# Patient Record
Sex: MALE | Race: WHITE
[De-identification: names, ages, dates, MRNs, and addresses within clinical notes are randomized per-mention and may not be internally consistent; named-entity substitution may affect disease eponyms.]

---

## 2019-04-04 ENCOUNTER — HOSPITAL ENCOUNTER (EMERGENCY)
Dept: HOSPITAL 47 - EC | Age: 59
Discharge: HOME | End: 2019-04-04
Payer: COMMERCIAL

## 2019-04-04 VITALS — TEMPERATURE: 98.4 F | SYSTOLIC BLOOD PRESSURE: 136 MMHG | DIASTOLIC BLOOD PRESSURE: 78 MMHG | HEART RATE: 74 BPM

## 2019-04-04 VITALS — RESPIRATION RATE: 18 BRPM

## 2019-04-04 DIAGNOSIS — Z23: ICD-10-CM

## 2019-04-04 DIAGNOSIS — S01.01XA: Primary | ICD-10-CM
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
## 2019-04-04 PROCEDURE — 99284 EMERGENCY DEPT VISIT MOD MDM: CPT

## 2019-04-04 PROCEDURE — 70450 CT HEAD/BRAIN W/O DYE: CPT

## 2019-04-04 PROCEDURE — 12001 RPR S/N/AX/GEN/TRNK 2.5CM/<: CPT

## 2019-04-04 PROCEDURE — 72125 CT NECK SPINE W/O DYE: CPT

## 2019-04-04 PROCEDURE — 90471 IMMUNIZATION ADMIN: CPT

## 2019-04-04 PROCEDURE — 90715 TDAP VACCINE 7 YRS/> IM: CPT

## 2019-04-04 NOTE — CT
EXAM:

  CT Head Without Intravenous Contrast

 

CLINICAL HISTORY:

   fall, head lac

 

TECHNIQUE:

  Axial computed tomography images of the head/brain without intravenous 

contrast.  CTDI is 45.29 mGy and DLP is 995.7 mGy-cm.  This CT exam was 

performed using one or more of the following dose reduction techniques: 

automated exposure control, adjustment of the mA and/or kV according to 

patient size, and/or use of iterative reconstruction technique.

 

COMPARISON:

  No relevant prior studies available.

 

FINDINGS:

  Brain:  Unremarkable.  No hemorrhage.  No significant white matter 

disease.  No edema.

  Ventricles:  Unremarkable.  No ventriculomegaly.

  Bones/joints:  Unremarkable.  No acute fracture.

  Soft tissues:  Soft tissue swelling/hematoma overlies the posterior 

right parietal calvarium.

  Sinuses:  Unremarkable as visualized.  No acute sinusitis.

  Mastoid air cells:  Unremarkable as visualized.  No mastoid effusion.

  Orbits:  Proptosis is suggested.

 

IMPRESSION:     

1.  No acute intracranial pathology.

2.  Soft tissue swelling/hematoma overlies the posterior right parietal 

calvarium.  No acute fracture.

3.  Proptosis is suggested.  Correlate clinically.

 

_______________________________________________

 

EXAM:

  CT Cervical Spine Without Intravenous Contrast

 

CLINICAL HISTORY:

  fall, head lac

 

TECHNIQUE:

  Axial computed tomography images of the cervical spine without 

intravenous contrast.  CTDI is 17.49 mGy and DLP is 473 mGy-cm.  This CT 

exam was performed using one or more of the following dose reduction 

techniques: automated exposure control, adjustment of the mA and/or kV 

according to patient size, and/or use of iterative reconstruction 

technique.

 

COMPARISON:

  No relevant prior studies available.

 

FINDINGS:

  Vertebrae:  No evidence of acute fracture or traumatic malalignment.  

Mild/moderate spondylosis with varying degrees of central canal and 

foramina stenoses, most notable at C6-C7.

  Discs/spinal canal/neural foramina:  See above.

  Soft tissues:  Unremarkable on this noncontrast study.

  Lung apices:  Mild biapical emphysematous changes are noted.

 

IMPRESSION:     

1.  No evidence of acute fracture or traumatic malalignment.

2.  Mild/moderate spondylosis with varying degrees of central canal and 

foramina stenoses, most notable at C6-C7.  MRI of the cervical spine at 

be obtained for further evaluation, if clinically indicated.

## 2019-04-05 NOTE — CDI
Documentation Clarification OP



Dear Callie PUENTES, DO



Please do addendum to ED report that describes the length and depth of the 
repair.



Thank you,

Rakel Bronson





If you have any question, Please contact  at [phone]



[facility]

## 2022-05-29 ENCOUNTER — HOSPITAL ENCOUNTER (EMERGENCY)
Dept: HOSPITAL 47 - EC | Age: 62
Discharge: OTHER INSTITUTION | End: 2022-05-29
Payer: COMMERCIAL

## 2022-05-29 VITALS — SYSTOLIC BLOOD PRESSURE: 107 MMHG | RESPIRATION RATE: 16 BRPM | HEART RATE: 72 BPM | DIASTOLIC BLOOD PRESSURE: 84 MMHG

## 2022-05-29 DIAGNOSIS — S66.201A: ICD-10-CM

## 2022-05-29 DIAGNOSIS — S62.501A: Primary | ICD-10-CM
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
## 2022-05-29 DIAGNOSIS — I10: ICD-10-CM

## 2022-05-29 LAB
ALBUMIN SERPL-MCNC: 5 G/DL (ref 3.5–5)
ALP SERPL-CCNC: 73 U/L (ref 38–126)
ALT SERPL-CCNC: 34 U/L (ref 4–49)
ANION GAP SERPL CALC-SCNC: 15 MMOL/L
APTT BLD: 22.1 SEC (ref 22–30)
AST SERPL-CCNC: 33 U/L (ref 17–59)
BASOPHILS # BLD AUTO: 0.1 K/UL (ref 0–0.2)
BASOPHILS NFR BLD AUTO: 1 %
BUN SERPL-SCNC: 15 MG/DL (ref 9–20)
CALCIUM SPEC-MCNC: 11.1 MG/DL (ref 8.4–10.2)
CHLORIDE SERPL-SCNC: 101 MMOL/L (ref 98–107)
CO2 SERPL-SCNC: 22 MMOL/L (ref 22–30)
EOSINOPHIL # BLD AUTO: 0.2 K/UL (ref 0–0.7)
EOSINOPHIL NFR BLD AUTO: 2 %
ERYTHROCYTE [DISTWIDTH] IN BLOOD BY AUTOMATED COUNT: 4.76 M/UL (ref 4.3–5.9)
ERYTHROCYTE [DISTWIDTH] IN BLOOD: 13.9 % (ref 11.5–15.5)
GLUCOSE SERPL-MCNC: 170 MG/DL (ref 74–99)
HCT VFR BLD AUTO: 42.4 % (ref 39–53)
HGB BLD-MCNC: 13.9 GM/DL (ref 13–17.5)
INR PPP: 1.2 (ref ?–1.2)
LYMPHOCYTES # SPEC AUTO: 2.9 K/UL (ref 1–4.8)
LYMPHOCYTES NFR SPEC AUTO: 29 %
MCH RBC QN AUTO: 29.3 PG (ref 25–35)
MCHC RBC AUTO-ENTMCNC: 32.9 G/DL (ref 31–37)
MCV RBC AUTO: 89.1 FL (ref 80–100)
MONOCYTES # BLD AUTO: 0.4 K/UL (ref 0–1)
MONOCYTES NFR BLD AUTO: 4 %
NEUTROPHILS # BLD AUTO: 6.3 K/UL (ref 1.3–7.7)
NEUTROPHILS NFR BLD AUTO: 63 %
PLATELET # BLD AUTO: 217 K/UL (ref 150–450)
POTASSIUM SERPL-SCNC: 4 MMOL/L (ref 3.5–5.1)
PROT SERPL-MCNC: 7.6 G/DL (ref 6.3–8.2)
PT BLD: 12.7 SEC (ref 9–12)
SODIUM SERPL-SCNC: 138 MMOL/L (ref 137–145)
WBC # BLD AUTO: 10 K/UL (ref 3.8–10.6)

## 2022-05-29 PROCEDURE — 86901 BLOOD TYPING SEROLOGIC RH(D): CPT

## 2022-05-29 PROCEDURE — 85025 COMPLETE CBC W/AUTO DIFF WBC: CPT

## 2022-05-29 PROCEDURE — 80053 COMPREHEN METABOLIC PANEL: CPT

## 2022-05-29 PROCEDURE — 36415 COLL VENOUS BLD VENIPUNCTURE: CPT

## 2022-05-29 PROCEDURE — 96375 TX/PRO/DX INJ NEW DRUG ADDON: CPT

## 2022-05-29 PROCEDURE — 86850 RBC ANTIBODY SCREEN: CPT

## 2022-05-29 PROCEDURE — 86900 BLOOD TYPING SEROLOGIC ABO: CPT

## 2022-05-29 PROCEDURE — 85730 THROMBOPLASTIN TIME PARTIAL: CPT

## 2022-05-29 PROCEDURE — 96376 TX/PRO/DX INJ SAME DRUG ADON: CPT

## 2022-05-29 PROCEDURE — 96365 THER/PROPH/DIAG IV INF INIT: CPT

## 2022-05-29 PROCEDURE — 73130 X-RAY EXAM OF HAND: CPT

## 2022-05-29 PROCEDURE — 99285 EMERGENCY DEPT VISIT HI MDM: CPT

## 2022-05-29 PROCEDURE — 85610 PROTHROMBIN TIME: CPT

## 2022-05-29 NOTE — XR
EXAMINATION TYPE: XR hand complete RT

 

DATE OF EXAM: 5/29/2022

 

COMPARISON: NONE

 

HISTORY: Laceration

 

TECHNIQUE: 3 views

 

FINDINGS: There is comminuted fracture of the Mid shaft of the first metacarpal. There is soft tissue
 deformity and laceration. No foreign body seen.

The other metacarpals appear intact. Fingers are intact.

 

IMPRESSION: Comminuted and displaced fracture of the first metacarpal. Laceration deformity.

## 2023-09-28 NOTE — ED
Fall HPI





- General


Chief Complaint: Fall


Stated Complaint: Fall, [facility]


Time Seen by Provider: 04/04/19 03:49


Source: patient, EMS


Mode of arrival: EMS





- History of Present Illness


Initial Comments: 


Alexandre is a 59-year-old woman presents the ED via EMS for evaluation of 

laceration to the head.  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

it was not in motion, he lost his balance falling backwards, he struck his head 

on the asphalt.  He did not lose consciousness he was ambulatory on scene 

however considering that this was a work related accident he didn't sustain a 

head injury he was encouraged come to the ER for evaluation.  Patient denies any

additional injuries.  He does not recall when his last tetanus vaccination was.








- Related Data


                                    Allergies











Allergy/AdvReac Type Severity Reaction Status Date / Time


 


No Known Allergies Allergy   Verified 04/04/19 03:40














Review of Systems


ROS Statement: 


Those systems with pertinent positive or pertinent negative responses have been 

documented in the HPI.





ROS Other: All systems not noted in ROS Statement are negative.





Past Medical History


Past Medical History: Hypertension


History of Any Multi-Drug Resistant Organisms: None Reported


Past Surgical History: Back Surgery, Cholecystectomy, Orthopedic Surgery


Past Psychological History: No Psychological Hx Reported


Smoking Status: Never smoker


Past Alcohol Use History: Rare


Past Drug Use History: None Reported





General Exam





- General Exam Comments


Initial Comments: 


Physical Exam


GENERAL:


Patient is well-developed and well-nourished.  


Patient is nontoxic and well-hydrated and is in no distress.





HENT:


Normocephalic


Hematoma over the occiput with a small laceration bleeding is controlled





EYES:


PERRL, EOMI





PULMONARY:


Unlabored respirations.  No audible rales rhonchi or wheezing was noted.





CARDIOVASCULAR:


There is a regular rate and rhythm without any murmurs gallops or rubs.  





ABDOMEN:


Soft and nontender with normal bowel sounds. 





SKIN:


Skin is clear with no lesions or rashes and otherwise unremarkable.





: 


Deferred





NEUROLOGIC:


Patient is alert and oriented x3.  Moving all extremities spontaneously





MUSCULOSKELETAL:


Normal extremities with adequate strength and full range of motion.  No lower 

extremity swelling or edema.  No calf tenderness.  


No midline cervical spinal tenderness





PSYCHIATRIC:


Normal psychiatric evaluation.  





Limitations: no limitations





Limitations: no limitations





Course


                                   Vital Signs











  04/04/19 04/04/19





  03:34 05:14


 


Temperature 98.7 F 98.4 F


 


Pulse Rate 91 74


 


Respiratory 18 18





Rate  


 


Blood Pressure 146/83 136/78


 


O2 Sat by Pulse 97 98





Oximetry  














Procedures





- Laceration


  ** Laceration #1


Consent Obtained: verbal consent


Indication: laceration


Site: scalp


Description: linear


Depth: simple, single layer


Anesthesia Technique: local infiltration


Amount (mls): 1


Pre-repair: wound explored, irrigated extensively, deep structures intact


Type of Sutures: nylon


Size of Sutures: 3-0


Number of Sutures: 2





Medical Decision Making





- Medical Decision Making


Patient was seen and evaluated per ATLS protocol, there are no immediate 

identified threats to airway, breathing or circulation next line secondary 

survey does reveal a hematoma to the occiput with a small laceration no active 

bleeding no other injuries identified


Imaging, topical let and tetanus were ordered





CT imaging with no acute intracranial abnormality


Scalp laceration repaired with 2 nylon sutures


Patient tolerated procedure well





Patient discharged home instable condition 








Disposition


Clinical Impression: 


 Fall, Laceration of scalp





Disposition: HOME SELF-CARE


Instructions (If sedation given, give patient instructions):  Care For Your 

Stitches (DC)


Is patient prescribed a controlled substance at d/c from ED?: No


When asked, does pt state using other controlled substances?: No


Referrals: 


Nonstaff,Physician [Primary Care Provider] - 1-2 days Advancement Flap (Single) Text: The defect edges were debeveled with a #15 scalpel blade. Given the location of the defect and the proximity to free margins a single advancement flap was deemed most appropriate. Using a sterile surgical marker, an appropriate advancement flap was drawn incorporating the defect and placing the expected incisions within the relaxed skin tension lines where possible. The area thus outlined was incised deep to adipose tissue with a #15 scalpel blade. The skin margins were undermined to an appropriate distance in all directions utilizing iris scissors. Following this, the designed flap was advanced and carried over into the primary defect and sutured into place.